# Patient Record
Sex: MALE | Race: WHITE | HISPANIC OR LATINO
[De-identification: names, ages, dates, MRNs, and addresses within clinical notes are randomized per-mention and may not be internally consistent; named-entity substitution may affect disease eponyms.]

---

## 2023-08-22 ENCOUNTER — NON-APPOINTMENT (OUTPATIENT)
Age: 23
End: 2023-08-22

## 2024-01-04 ENCOUNTER — APPOINTMENT (OUTPATIENT)
Dept: COLORECTAL SURGERY | Facility: CLINIC | Age: 24
End: 2024-01-04
Payer: COMMERCIAL

## 2024-01-04 VITALS
WEIGHT: 177 LBS | SYSTOLIC BLOOD PRESSURE: 125 MMHG | HEART RATE: 78 BPM | DIASTOLIC BLOOD PRESSURE: 71 MMHG | BODY MASS INDEX: 25.34 KG/M2 | RESPIRATION RATE: 16 BRPM | OXYGEN SATURATION: 100 % | HEIGHT: 70 IN

## 2024-01-04 DIAGNOSIS — K64.5 PERIANAL VENOUS THROMBOSIS: ICD-10-CM

## 2024-01-04 PROBLEM — Z00.00 ENCOUNTER FOR PREVENTIVE HEALTH EXAMINATION: Status: ACTIVE | Noted: 2024-01-04

## 2024-01-04 PROCEDURE — 99204 OFFICE O/P NEW MOD 45 MIN: CPT

## 2024-01-04 NOTE — ASSESSMENT
[FreeTextEntry1] : Mr. Gtz presents to the office with a nontender RP thrombosed external hemorrhoid. As he is currently asymptomatic, I have recommended conservative management using warm sitz baths to facilitate resolution of the thrombus on its own. This is a preferable approach in comparison to incising the hemorrhoid and creating pain and discomfort. He understands that it may require 4-6 weeks' time for the thrombus to fully resorb. In the interim, he is to avoid excessive straining, whether with activity or with evacuating stools. In the future, recurrent episodes that result in anal pain can be seen in office for incision and removal of the hemorrhoidal clot.

## 2024-01-04 NOTE — HISTORY OF PRESENT ILLNESS
[FreeTextEntry1] : Mr. Gtz presents to the office for consultation secondary to an anorectal lump that was noted 1.5 weeks prior. No known inciting events as noted the lump when showering. He denies constipation or straining to evacuate stools. He does weight lift regularly in the gym. No significant pain associated with the region.

## 2024-01-04 NOTE — PHYSICAL EXAM
[Normal rectal exam] : exam was normal [None] : no anal fissures seen [Thrombosed] : that was thrombosed [No Rash or Lesion] : No rash or lesion [Alert] : alert [Oriented to Person] : oriented to person [Oriented to Place] : oriented to place [Oriented to Time] : oriented to time [Calm] : calm [de-identified] : small right posterior PADMAJA [de-identified] : No apparent distress [de-identified] : Normocephalic atraumatic [de-identified] : Moving all extremities x 4

## 2024-03-08 ENCOUNTER — APPOINTMENT (OUTPATIENT)
Dept: COLORECTAL SURGERY | Facility: CLINIC | Age: 24
End: 2024-03-08
Payer: COMMERCIAL

## 2024-03-08 VITALS
RESPIRATION RATE: 16 BRPM | HEIGHT: 70 IN | DIASTOLIC BLOOD PRESSURE: 72 MMHG | BODY MASS INDEX: 25.77 KG/M2 | SYSTOLIC BLOOD PRESSURE: 111 MMHG | HEART RATE: 74 BPM | WEIGHT: 180 LBS

## 2024-03-08 DIAGNOSIS — Z78.9 OTHER SPECIFIED HEALTH STATUS: ICD-10-CM

## 2024-03-08 DIAGNOSIS — K64.8 OTHER HEMORRHOIDS: ICD-10-CM

## 2024-03-08 DIAGNOSIS — R19.7 DIARRHEA, UNSPECIFIED: ICD-10-CM

## 2024-03-08 PROCEDURE — 46600 DIAGNOSTIC ANOSCOPY SPX: CPT

## 2024-03-08 PROCEDURE — 99214 OFFICE O/P EST MOD 30 MIN: CPT | Mod: 25

## 2024-03-08 RX ORDER — EMTRICITABINE AND TENOFOVIR DISOPROXIL FUMARATE 167; 250 MG/1; MG/1
TABLET, FILM COATED ORAL
Refills: 0 | Status: ACTIVE | COMMUNITY

## 2024-03-08 RX ORDER — HYDROCORTISONE 25 MG/G
2.5 CREAM TOPICAL
Qty: 30 | Refills: 3 | Status: ACTIVE | COMMUNITY
Start: 2024-03-08 | End: 1900-01-01

## 2024-03-08 NOTE — HISTORY OF PRESENT ILLNESS
[FreeTextEntry1] : Mr. Gtz presents to the office for consultation secondary to an anorectal lump that was noted 1.5 weeks prior. No known inciting events as noted the lump when showering. He denies constipation or straining to evacuate stools. He does weight lift regularly in the gym. No significant pain associated with the region.   3/8/24 Mr. Gtz returns to the office for reports of anorectal burning and swelling.  This has been present for at least a week.  He was concerned that this was a genital herpes outbreak and was prescribed Valtrex by his PCP.  Here now for further evaluation.  He reports that of late, bowel movements which were usually passed only once daily, are now passed 3 times a day and often loose.  No recent change in dietary habits.  No rectal bleeding reported.

## 2024-03-08 NOTE — ASSESSMENT
[FreeTextEntry1] : Mr. Gtz presents to the office with a nontender RP thrombosed external hemorrhoid. As he is currently asymptomatic, I have recommended conservative management using warm sitz baths to facilitate resolution of the thrombus on its own. This is a preferable approach in comparison to incising the hemorrhoid and creating pain and discomfort. He understands that it may require 4-6 weeks' time for the thrombus to fully resorb. In the interim, he is to avoid excessive straining, whether with activity or with evacuating stools. In the future, recurrent episodes that result in anal pain can be seen in office for incision and removal of the hemorrhoidal clot.  3/8/24 Kermit returns to the office for follow-up.  He had concerns of a genital herpes outbreak, but anorectal exam including visualization of the anal skin, MAGDALENA and anoscopy did not reveal as such.  I advised him on the appearance typically of genital herpes in the perianal skin as well as colitis associated with STDs.  I suspect his symptoms of burning and swelling after bowel movements are the results of loose stools.  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  In the meantime, I recommended a high-fiber diet to help bulk up his stools and allow for  evacuation as well as decreased frequency of bowel movements.  Finally, to alleviate any hemorrhoidal irritation, recommend hydrocortisone cream 2.5% to be applied 3 times a day until asymptomatic.  Patient understands and is agreeable with the above plan of care.

## 2024-03-08 NOTE — PHYSICAL EXAM
[Normal rectal exam] : exam was normal [Excoriation] : no perianal excoriation [Wart] : no warts [Ulcer ___ cm] : no ulcers [Reduce Spontaneously] : a spontaneously reducible (grade II) [Tender, Swollen] : nontender, non-swollen [Skin Tags] : there were no residual hemorrhoidal skin tags seen [Thrombosed] : that was not thrombosed [Normal] : was normal [None] : there was no rectal abscess [No Rash or Lesion] : No rash or lesion [Gross Blood] : no gross blood [Alert] : alert [Oriented to Person] : oriented to person [Oriented to Place] : oriented to place [Oriented to Time] : oriented to time [de-identified] : No perianal skin abnormality such as vesicular lesions [Calm] : calm [de-identified] : No apparent distress [de-identified] : Normocephalic atraumatic [de-identified] : Moving all extremities x 4

## 2024-03-14 ENCOUNTER — NON-APPOINTMENT (OUTPATIENT)
Age: 24
End: 2024-03-14

## 2024-03-14 LAB — GI PCR PANEL: NOT DETECTED

## 2024-05-16 ENCOUNTER — NON-APPOINTMENT (OUTPATIENT)
Age: 24
End: 2024-05-16

## 2024-07-19 ENCOUNTER — APPOINTMENT (OUTPATIENT)
Dept: COLORECTAL SURGERY | Facility: CLINIC | Age: 24
End: 2024-07-19
Payer: COMMERCIAL

## 2024-07-19 VITALS
DIASTOLIC BLOOD PRESSURE: 71 MMHG | WEIGHT: 180 LBS | RESPIRATION RATE: 16 BRPM | SYSTOLIC BLOOD PRESSURE: 124 MMHG | BODY MASS INDEX: 25.77 KG/M2 | HEART RATE: 67 BPM | HEIGHT: 70 IN

## 2024-07-19 DIAGNOSIS — K64.5 PERIANAL VENOUS THROMBOSIS: ICD-10-CM

## 2024-07-19 PROCEDURE — 46600 DIAGNOSTIC ANOSCOPY SPX: CPT

## 2024-07-19 PROCEDURE — 99213 OFFICE O/P EST LOW 20 MIN: CPT | Mod: 25

## 2024-08-26 ENCOUNTER — NON-APPOINTMENT (OUTPATIENT)
Age: 24
End: 2024-08-26

## 2025-02-10 ENCOUNTER — APPOINTMENT (OUTPATIENT)
Dept: COLORECTAL SURGERY | Facility: CLINIC | Age: 25
End: 2025-02-10
Payer: COMMERCIAL

## 2025-02-10 ENCOUNTER — NON-APPOINTMENT (OUTPATIENT)
Age: 25
End: 2025-02-10

## 2025-02-10 VITALS
HEIGHT: 70 IN | BODY MASS INDEX: 25.77 KG/M2 | WEIGHT: 180 LBS | OXYGEN SATURATION: 99 % | DIASTOLIC BLOOD PRESSURE: 83 MMHG | RESPIRATION RATE: 14 BRPM | TEMPERATURE: 98.2 F | HEART RATE: 65 BPM | SYSTOLIC BLOOD PRESSURE: 127 MMHG

## 2025-02-10 DIAGNOSIS — K64.5 PERIANAL VENOUS THROMBOSIS: ICD-10-CM

## 2025-02-10 PROCEDURE — 99214 OFFICE O/P EST MOD 30 MIN: CPT

## 2025-03-18 ENCOUNTER — EMERGENCY (EMERGENCY)
Facility: HOSPITAL | Age: 25
LOS: 0 days | Discharge: HOME | End: 2025-03-18
Attending: EMERGENCY MEDICINE
Payer: COMMERCIAL

## 2025-03-18 VITALS
OXYGEN SATURATION: 99 % | WEIGHT: 186.51 LBS | TEMPERATURE: 99 F | DIASTOLIC BLOOD PRESSURE: 74 MMHG | RESPIRATION RATE: 18 BRPM | SYSTOLIC BLOOD PRESSURE: 131 MMHG | HEART RATE: 84 BPM

## 2025-03-18 VITALS
RESPIRATION RATE: 16 BRPM | OXYGEN SATURATION: 100 % | TEMPERATURE: 98 F | DIASTOLIC BLOOD PRESSURE: 86 MMHG | HEART RATE: 76 BPM | SYSTOLIC BLOOD PRESSURE: 147 MMHG

## 2025-03-18 DIAGNOSIS — N50.811 RIGHT TESTICULAR PAIN: ICD-10-CM

## 2025-03-18 DIAGNOSIS — N45.1 EPIDIDYMITIS: ICD-10-CM

## 2025-03-18 DIAGNOSIS — R82.4 ACETONURIA: ICD-10-CM

## 2025-03-18 LAB
APPEARANCE UR: CLEAR — SIGNIFICANT CHANGE UP
BILIRUB UR-MCNC: NEGATIVE — SIGNIFICANT CHANGE UP
COLOR SPEC: YELLOW — SIGNIFICANT CHANGE UP
DIFF PNL FLD: NEGATIVE — SIGNIFICANT CHANGE UP
GLUCOSE UR QL: NEGATIVE MG/DL — SIGNIFICANT CHANGE UP
KETONES UR-MCNC: ABNORMAL MG/DL
LEUKOCYTE ESTERASE UR-ACNC: NEGATIVE — SIGNIFICANT CHANGE UP
NITRITE UR-MCNC: NEGATIVE — SIGNIFICANT CHANGE UP
PH UR: 5.5 — SIGNIFICANT CHANGE UP (ref 5–8)
PROT UR-MCNC: SIGNIFICANT CHANGE UP MG/DL
SP GR SPEC: >1.03 — HIGH (ref 1–1.03)
UROBILINOGEN FLD QL: 1 MG/DL — SIGNIFICANT CHANGE UP (ref 0.2–1)

## 2025-03-18 PROCEDURE — 81003 URINALYSIS AUTO W/O SCOPE: CPT

## 2025-03-18 PROCEDURE — 76870 US EXAM SCROTUM: CPT | Mod: 26

## 2025-03-18 PROCEDURE — 76870 US EXAM SCROTUM: CPT

## 2025-03-18 PROCEDURE — 93975 VASCULAR STUDY: CPT

## 2025-03-18 PROCEDURE — 99285 EMERGENCY DEPT VISIT HI MDM: CPT | Mod: 25

## 2025-03-18 PROCEDURE — 87491 CHLMYD TRACH DNA AMP PROBE: CPT

## 2025-03-18 PROCEDURE — 96372 THER/PROPH/DIAG INJ SC/IM: CPT

## 2025-03-18 PROCEDURE — 99284 EMERGENCY DEPT VISIT MOD MDM: CPT

## 2025-03-18 PROCEDURE — 87591 N.GONORRHOEAE DNA AMP PROB: CPT

## 2025-03-18 PROCEDURE — 93975 VASCULAR STUDY: CPT | Mod: 26

## 2025-03-18 RX ORDER — IBUPROFEN 200 MG
600 TABLET ORAL ONCE
Refills: 0 | Status: COMPLETED | OUTPATIENT
Start: 2025-03-18 | End: 2025-03-18

## 2025-03-18 RX ORDER — CEFTRIAXONE 500 MG/1
500 INJECTION, POWDER, FOR SOLUTION INTRAMUSCULAR; INTRAVENOUS ONCE
Refills: 0 | Status: COMPLETED | OUTPATIENT
Start: 2025-03-18 | End: 2025-03-18

## 2025-03-18 RX ORDER — DOXYCYCLINE HYCLATE 100 MG
1 TABLET ORAL
Qty: 20 | Refills: 0
Start: 2025-03-18 | End: 2025-03-27

## 2025-03-18 RX ORDER — DOXYCYCLINE HYCLATE 100 MG
100 TABLET ORAL ONCE
Refills: 0 | Status: COMPLETED | OUTPATIENT
Start: 2025-03-18 | End: 2025-03-18

## 2025-03-18 RX ADMIN — Medication 600 MILLIGRAM(S): at 21:54

## 2025-03-18 RX ADMIN — Medication 100 MILLIGRAM(S): at 23:46

## 2025-03-18 RX ADMIN — CEFTRIAXONE 500 MILLIGRAM(S): 500 INJECTION, POWDER, FOR SOLUTION INTRAMUSCULAR; INTRAVENOUS at 23:46

## 2025-03-18 NOTE — ED ADULT TRIAGE NOTE - CHIEF COMPLAINT QUOTE
pt ambulatory to ED for c/o right testicular pain x 2 days. denies injury/trauma. unsure of any difference in appearance of testicle but endorses tenderness to touch and mild swelling. a&oz4. NKDA.

## 2025-03-18 NOTE — ED STATDOCS - CLINICAL SUMMARY MEDICAL DECISION MAKING FREE TEXT BOX
Laboratory and Imaging Results:    Urinalysis: Trace ketones, otherwise unremarkable. No pyuria or bacteriuria.  Scrotal ultrasound:  Findings: Mild hyperemia of the right epididymis consistent with epididymitis.  No evidence of testicular torsion. Normal testicular blood flow bilaterally.  Incidental findings: Small bilateral hydroceles, no varicoceles.  Chlamydia and Gonorrhea NAAT sent.    Diagnosis: Right epididymitis. No evidence of torsion or other emergent pathology.    Differential Diagnosis Considered:    Testicular torsion: Ruled out by Doppler ultrasound showing normal testicular blood flow.  Orchitis: Less likely given lack of systemic symptoms (fever, malaise), no significant testicular swelling.  Inguinal hernia: No palpable hernia, no groin bulge.  Hydrocele: Not primary source of symptoms, but noted bilaterally.  Varicocele: Not present on ultrasound.    Treatment and Plan:    Empiric antibiotic therapy initiated with Ceftriaxone 500 mg IM and Doxycycline 100mg BID  for possible STI-related epididymitis.  Analgesia: Patient advised to use NSAIDs as needed for pain relief.  Supportive care: Advised rest, scrotal elevation, and warm compresses.  Follow-up: Close outpatient follow-up with primary care and urology within one week.  Patient Education:  Condition explained, including causes and expected course.  Discussed importance of completing antibiotics and practicing safe sexual habits.  Advised that final STI results will be reviewed, and if positive, patient will be contacted for further treatment as needed.  Strict return precautions given for any worsening pain, fever, increasing swelling, difficulty urinating, or other concerning symptoms.  Disposition: Discharged in stable condition. Patient verbalized understanding and agreement with plan.

## 2025-03-18 NOTE — ED STATDOCS - PATIENT PORTAL LINK FT
You can access the FollowMyHealth Patient Portal offered by [facility] by registering at the following website: [email or web address]. By joining [employer]’s FollowMyHealth portal, you will also be able to view your health information using other applications (apps) compatible with our system.

## 2025-03-18 NOTE — ED STATDOCS - PROGRESS NOTE DETAILS
[General Appearance - Well Developed] : well developed [Normal Appearance] : normal appearance [Well Groomed] : well groomed [General Appearance - Well Nourished] : well nourished [No Deformities] : no deformities [General Appearance - In No Acute Distress] : no acute distress [Normal Conjunctiva] : the conjunctiva exhibited no abnormalities [Normal Oral Mucosa] : normal oral mucosa [No Oral Pallor] : no oral pallor [No Oral Cyanosis] : no oral cyanosis [Normal Jugular Venous A Waves Present] : normal jugular venous A waves present [Normal Jugular Venous V Waves Present] : normal jugular venous V waves present [No Jugular Venous Arango A Waves] : no jugular venous arango A waves [Normal Rate] : normal [Rhythm Regular] : regular [Normal S1] : normal S1 [Normal S2] : normal S2 [No Murmur] : no murmurs heard [No Pitting Edema] : no pitting edema present [Respiration, Rhythm And Depth] : normal respiratory rhythm and effort [Exaggerated Use Of Accessory Muscles For Inspiration] : no accessory muscle use [Auscultation Breath Sounds / Voice Sounds] : lungs were clear to auscultation bilaterally [Bowel Sounds] : normal bowel sounds [Abdomen Soft] : soft [Abdomen Tenderness] : non-tender [Abnormal Walk] : normal gait [Gait - Sufficient For Exercise Testing] : the gait was sufficient for exercise testing Patient seen and evaluated, ED attending note and orders reviewed, will continue with patient follow up and care -Aracelis Ball PA-C [Nail Clubbing] : no clubbing of the fingernails [Cyanosis, Localized] : no localized cyanosis [Petechial Hemorrhages (___cm)] : no petechial hemorrhages [Skin Color & Pigmentation] : normal skin color and pigmentation [] : no rash Urine without any evidence of UTI.  Talking back positive for right epididymitis.  Patient states that he is sexually active last sexual encounter approximately 1 month ago.  Will treat for both gonorrhea and chlamydia with IV Rocephin and doxycycline for 10 days.  Patient was made aware and agrees with plan. -Tanner Sinha PA-C [No Skin Ulcers] : no skin ulcer [Oriented To Time, Place, And Person] : oriented to person, place, and time [Affect] : the affect was normal [Mood] : the mood was normal [No Anxiety] : not feeling anxious [FreeTextEntry1] : Extraocular muscles intact. Anicteric sclerae. [S3] : no S3 [Right Carotid Bruit] : no bruit heard over the right carotid [Left Carotid Bruit] : no bruit heard over the left carotid [Bruit] : no bruit heard

## 2025-03-18 NOTE — ED STATDOCS - NSFOLLOWUPINSTRUCTIONS_ED_ALL_ED_FT
Testicle Pain    WHAT YOU NEED TO KNOW:    Testicle pain may start in your scrotum and spread to your abdomen. You may have sharp, sudden pain or dull pain that happens over time. Your testicle pain may come and go, or it may last for a long time. The cause of your pain may be unknown. Testicle pain can be caused by infection, trauma, hernia, kidney stones, or sexually transmitted infections (STIs). You may have a painful lump in your scrotum. The lump may be caused by an enlarged vein or fluid that collects around one of your testicles. This lump also may be caused by a more serious medical condition. Part of your testicle may twist. This is a serious condition that needs treatment as soon as possible.    DISCHARGE INSTRUCTIONS:    Medicines:    Antibiotics: This medicine helps fight or prevent infection. Take your antibiotics until they are gone, even if you feel better.    Pain medicine: You may be given a prescription medicine to decrease pain. Do not wait until the pain is severe before you take this medicine.    NSAIDs: These medicines decrease swelling, pain, and fever. NSAIDs are available without a doctor's order. Ask your healthcare provider which medicine is right for you. Ask how much to take and when to take it. Take as directed. NSAIDs can cause stomach bleeding and kidney problems if not taken correctly.    Take your medicine as directed. Contact your healthcare provider if you think your medicine is not helping or if you have side effects. Tell your provider if you are allergic to any medicine. Keep a list of the medicines, vitamins, and herbs you take. Include the amounts, and when and why you take them. Bring the list or the pill bottles to follow-up visits. Carry your medicine list with you in case of an emergency.  Decrease discomfort: With treatment, your pain may improve within 1 to 3 days. Depending on the cause of your testicle pain, your condition may take up to 4 weeks to heal.    Rest: Limit your activity until your pain decreases. Get more rest while you heal. Do not sit for long periods of time.    Cold packs: Place cold packs on your testicles to help ease your pain. Use cold packs as directed.    Elevation: Gently tuck a folded towel under your testicles to lift them as you sit in a chair or lie in bed. This will help ease your pain and decrease swelling.  Follow up with your healthcare provider or urologist in 3 to 7 days: Write down your questions so you remember to ask them during your visits.    Sexual activity: Avoid sexual activity until you have finished your antibiotics or until your healthcare provider tells you it is safe to have sex. Use condoms to lower your risk of STIs.    Contact your healthcare provider or urologist if:    You feel that your medicine or treatment is not working.    You feel more pain, tenderness, or swelling than before.    You have nausea or a low fever.    You have questions or concerns about your condition or care.  Return to the emergency department if:    You have sudden or severe pain in your testicles or abdomen.    You have pain in both testicles.    You are vomiting.    You have a high fever.    Your pain increases when you elevate your testicles.    Your scrotum turns blue. This could mean your testicle is not getting the blood flow it needs. Epididymitis  The male reproductive organ, showing the epididymis and the testicle.  Epididymitis is inflammation or swelling of the epididymis. This is caused by an infection. The epididymis is a cord-like structure that is located along the top and back part of the testicle. It collects and stores sperm from the testicle.    This condition can also cause pain and swelling of the testicle and scrotum. Symptoms usually start suddenly (acute epididymitis). Sometimes epididymitis starts gradually and lasts for a while (chronic epididymitis). Chronic epididymitis may be harder to treat.    What are the causes?  In men ages 20–40, this condition is usually caused by a bacterial infection or a sexually transmitted infection (STI), such as gonorrhea or chlamydia.    In men 40 and older, this condition is usually caused by bacteria from a urinary blockage or from abnormalities in the urinary system. These can result from:  Having a tube placed into the bladder (urinary catheter).  Having an enlarged or inflamed prostate gland.  Having recently had urinary tract surgery.  Having a problem with a backward flow of urine (retrograde).  In men who have a condition that weakens the body's defense system (immune system), such as human immunodeficiency virus (HIV), this condition can be caused by:  Other bacteria, including tuberculosis and syphilis.  Viruses.  Fungi.  Sometimes this condition occurs without infection. This may happen because of trauma or repetitive activities such as sports.    What increases the risk?  You are more likely to develop this condition if you have:  Unprotected sex with more than one partner.  Anal sex.  Had recent surgery.  A urinary catheter.  Urinary problems.  A suppressed immune system.  What are the signs or symptoms?  This condition usually begins suddenly with chills, fever, and pain behind the scrotum and in the testicle. Other symptoms include:  Swelling of the scrotum, testicle, or both.  Pain when ejaculating or urinating.  Pain in the back or abdomen.  Nausea.  Itching and discharge from the penis.  A frequent need to pass urine.  Redness, increased warmth, and tenderness of the scrotum.  How is this diagnosed?  Your health care provider can diagnose this condition based on your symptoms and medical history. Your health care provider will also do a physical exam to check your scrotum and testicle for swelling, pain, and redness. You may also have other tests, including:  Testing of discharge from the penis.  Testing your urine for infections, such as STIs.  Ultrasound to check for blood flow and inflammation.  Your health care provider may test you for other STIs, including HIV.    How is this treated?  Treatment for this condition depends on the cause. If your condition is caused by a bacterial infection, oral antibiotic medicine may be prescribed. If the bacterial infection has spread to your blood, you may need to receive IV antibiotics.    For both bacterial and nonbacterial epididymitis, you may be treated with:  Rest.  Elevation of the scrotum.  Pain medicines.  Anti-inflammatory medicines.  Surgery may be needed if:  You have pus buildup in the scrotum (abscess).  You have epididymitis that has not responded to other treatments.  Follow these instructions at home:  Medicines    Take over-the-counter and prescription medicines only as told by your health care provider.  If you were prescribed an antibiotic medicine, take it as told by your health care provider. Do not stop taking the antibiotic even if your condition improves.  Sexual activity    If your epididymitis was caused by an STI, avoid sexual activity until your treatment is complete.  Inform your sexual partner or partners if you test positive for an STI. They may need to be treated. Do not engage in sexual activity with your partner or partners until their treatment is completed.  Managing pain and swelling    A bathtub partially filled with water.  If directed, raise (elevate) your scrotum and apply ice. To do this:  Put ice in a plastic bag.  Place a small towel or pillow between your legs.  Rest your scrotum on the pillow or towel.  Place another towel between your skin and the plastic bag.  Leave the ice on for 20 minutes, 2–3 times a day.  Remove the ice if your skin turns bright red. This is very important. If you cannot feel pain, heat, or cold, you have a greater risk of damage to the area.  Keep your scrotum elevated and supported while resting. Ask your health care provider if you should wear a scrotal support, such as a jockstrap. Wear it as told by your health care provider.  Try taking a sitz bath to help with discomfort. This is a warm water bath that is taken while you are sitting down. The water should come up to your hips and should cover your buttocks. Do this 3–4 times per day or as told by your health care provider.  General instructions    Drink enough fluid to keep your urine pale yellow.  Return to your normal activities as told by your health care provider. Ask your health care provider what activities are safe for you.  Keep all follow-up visits. This is important.  Contact a health care provider if:  You have a fever.  Your pain medicine is not helping.  Your pain is getting worse.  Your symptoms do not improve within 3 days.  Summary  Epididymitis is inflammation or swelling of the epididymis. This is caused by an infection. This condition can also cause pain and swelling of the testicle and scrotum.  Treatment for this condition depends on the cause. If your condition is caused by a bacterial infection, oral antibiotic medicine may be prescribed.  Inform your sexual partner or partners if you test positive for an STI. They may need to be treated. Do not engage in sexual activity with your partner or partners until their treatment is completed.  Contact a health care provider if your symptoms do not improve within 3 days.

## 2025-03-18 NOTE — ED STATDOCS - NS ED ROS FT
Constitutional: No fever, chills, or malaise.  Genitourinary: Reports right testicular pain and mild swelling. Denies dysuria, hematuria, penile discharge, or flank pain.  Gastrointestinal: No nausea, vomiting, abdominal pain, or diarrhea.  Musculoskeletal: No radiating pain to lower extremities.  Neurological: No sensory deficits or weakness.

## 2025-03-18 NOTE — ED STATDOCS - OBJECTIVE STATEMENT
25 y/o M with no pertinent PMHx presents to the ED c/o atraumatic R sided testicular pain x2 days. Endorses mild swelling. Denies skin changes or redness, fevers, recent illnesses. No pain medication taken at home. 24-year-old male with no significant past medical history presents to the ED with atraumatic right-sided testicular pain for two days. He describes the pain as dull and intermittent, with mild associated swelling. He denies erythema, skin changes, dysuria, hematuria, fever, nausea, vomiting, recent illnesses, trauma, or new sexual partners. No pain medication was taken at home. No prior history of similar symptoms.

## 2025-03-18 NOTE — ED STATDOCS - GENITOURINARY, MLM
no bladder tenderness, no bilateral CVA tenderness. no reproducible testicular tenderness, no redness, no swelling, no hernia normal...

## 2025-03-19 LAB
C TRACH RRNA SPEC QL NAA+PROBE: SIGNIFICANT CHANGE UP
N GONORRHOEA RRNA SPEC QL NAA+PROBE: SIGNIFICANT CHANGE UP
SPECIMEN SOURCE: SIGNIFICANT CHANGE UP